# Patient Record
Sex: MALE | URBAN - METROPOLITAN AREA
[De-identification: names, ages, dates, MRNs, and addresses within clinical notes are randomized per-mention and may not be internally consistent; named-entity substitution may affect disease eponyms.]

---

## 2017-09-23 ENCOUNTER — IMPORTED ENCOUNTER (OUTPATIENT)
Dept: URBAN - METROPOLITAN AREA CLINIC 1 | Facility: CLINIC | Age: 42
End: 2017-09-23

## 2017-09-23 PROBLEM — H40.013: Noted: 2017-09-23

## 2017-09-23 PROBLEM — E11.9: Noted: 2017-09-23

## 2017-09-23 PROBLEM — Z79.84: Noted: 2017-09-23

## 2017-09-23 PROBLEM — H18.413: Noted: 2017-09-23

## 2017-09-23 PROBLEM — H25.13: Noted: 2017-09-23

## 2017-09-23 PROBLEM — H47.012: Noted: 2017-09-23

## 2017-09-23 PROCEDURE — 92014 COMPRE OPH EXAM EST PT 1/>: CPT

## 2017-09-23 NOTE — PATIENT DISCUSSION
1.  DM Type II without sign of diabetic retinopathy and no blot heme on dilated retinal examination today OU No Macular Edema: Stable. Discussed the pathophysiology of diabetes and its effect on the eye and risk of blindness. Stressed the importance of strong glucose control. Advised of importance of at least yearly dilated examinations but to contact us immediately for any problems or concerns. 2. Type II diabetes controlled by oral medications. 3.  Glaucoma Suspect OU : (0.8/0.85)- Stable IOP OU. Cupping likely physiologic. Patient is considered Low Risk. Condition was discussed with patient and patient understands. Will continue to monitor patient for any progression in condition. Patient was advised to call us with any problems questions or concerns. 4.  Ischemic Optic Atrophy OS: Stable/Observe. (H/o Optic Neuritis)5. Cataract OU: Observe for now without intervention. The patient was advised to contact us if any change or worsening of vision6. Suri Morales. Return for an appointment for 1 year for a 30/ OCT with Dr. Nicole Rice.

## 2019-08-15 ENCOUNTER — IMPORTED ENCOUNTER (OUTPATIENT)
Dept: URBAN - METROPOLITAN AREA CLINIC 1 | Facility: CLINIC | Age: 44
End: 2019-08-15

## 2019-08-15 PROBLEM — Z79.84: Noted: 2019-08-15

## 2019-08-15 PROBLEM — H40.013: Noted: 2019-08-15

## 2019-08-15 PROBLEM — E11.9: Noted: 2019-08-15

## 2019-08-15 PROBLEM — H25.813: Noted: 2019-08-15

## 2019-08-15 PROCEDURE — 92133 CPTRZD OPH DX IMG PST SGM ON: CPT

## 2019-08-15 PROCEDURE — 92014 COMPRE OPH EXAM EST PT 1/>: CPT

## 2019-08-15 NOTE — PATIENT DISCUSSION
1.  DM Type II (Oral Med) -- without sign of diabetic retinopathy and no blot heme on dilated retinal examination today OU No Macular Edema: Stable. Discussed the pathophysiology of diabetes and its effect on the eye and risk of blindness. Stressed the importance of strong glucose control. Advised of importance of at least yearly dilated examinations but to contact us immediately for any problems or concerns. 2. Glaucoma Suspect OU -- (0.8/0.85)- Stable IOP 16 OU. OCT done today WNL OD minimal thinning OS likely secondary to previous Optic Neuritis / Optic Atrophy. Patient is considered Low Risk. Condition was discussed with patient and patient understands. Will continue to monitor patient for any progression in condition. Patient was advised to call us with any problems questions or concerns. 3.  Ischemic Optic Atrophy OS -- Stable/Observe. (H/o Optic Neuritis)4. Cataract OU -- Observe for now without intervention. The patient was advised to contact us if any change or worsening of vision5. Arcus OU. Patient defers Mrx today. Return for an appointment in 1 year for a 30/HVF with Dr. Emelyn Garibay.

## 2021-01-22 ENCOUNTER — IMPORTED ENCOUNTER (OUTPATIENT)
Dept: URBAN - METROPOLITAN AREA CLINIC 1 | Facility: CLINIC | Age: 46
End: 2021-01-22

## 2021-01-22 PROBLEM — H40.013: Noted: 2021-01-22

## 2021-01-22 PROBLEM — H25.813: Noted: 2021-01-22

## 2021-01-22 PROBLEM — H18.413: Noted: 2021-01-22

## 2021-01-22 PROBLEM — E11.9: Noted: 2021-01-22

## 2021-01-22 PROBLEM — Z79.84: Noted: 2021-01-22

## 2021-01-22 PROCEDURE — 92083 EXTENDED VISUAL FIELD XM: CPT

## 2021-01-22 PROCEDURE — 99214 OFFICE O/P EST MOD 30 MIN: CPT

## 2021-01-22 NOTE — PATIENT DISCUSSION
1.  DM Type II (Oral Meds) without sign of diabetic retinopathy and no blot heme on dilated retinal examination today OU No Macular Edema -- Discussed the pathophysiology of diabetes and its effect on the eye and risk of blindness. Stressed the importance of strong glucose control. Advised of importance of at least yearly dilated examinations but to contact us immediately for any problems or concerns. 2. Glaucoma Suspect OU -- (C/D: 0.80/0.85) VF WNL no progression OU today. IOP 16 OU. T-Max 16 OU. (-) Family Hx. Patient is considered Low Risk. Condition was discussed with patient and patient understands. Will continue to monitor patient for any progression in condition. Patient was advised to call us with any problems questions or concerns. 3.  Cataract OU -- Observe for now without intervention. The patient was advised to contact us if any change or worsening of vision4. Arcus OU5. Ischemic Optic Atrophy OS -- Stable. Observe. Patient deferred MRx at today's visit. Letter to PCP. Return for an appointment in 1 year 30/OCT with Dr. Joellen Lantigua.

## 2022-04-08 ASSESSMENT — TONOMETRY
OS_IOP_MMHG: 16
OD_IOP_MMHG: 16
OD_IOP_MMHG: 16
OS_IOP_MMHG: 15
OD_IOP_MMHG: 15
OS_IOP_MMHG: 16

## 2022-04-08 ASSESSMENT — VISUAL ACUITY
OD_CC: 20/20
OS_CC: 20/25+1
OD_CC: 20/20
OD_CC: 20/20
OS_CC: 20/20
OS_CC: 20/20
OD_SC: J1+
OS_SC: J1+